# Patient Record
Sex: FEMALE | Race: WHITE | Employment: FULL TIME | URBAN - NONMETROPOLITAN AREA
[De-identification: names, ages, dates, MRNs, and addresses within clinical notes are randomized per-mention and may not be internally consistent; named-entity substitution may affect disease eponyms.]

---

## 2023-03-24 ENCOUNTER — OFFICE VISIT (OUTPATIENT)
Dept: BARIATRICS/WEIGHT MGMT | Age: 38
End: 2023-03-24
Payer: MEDICAID

## 2023-03-24 VITALS
WEIGHT: 268 LBS | HEART RATE: 88 BPM | HEIGHT: 63 IN | DIASTOLIC BLOOD PRESSURE: 76 MMHG | SYSTOLIC BLOOD PRESSURE: 94 MMHG | TEMPERATURE: 98.2 F | BODY MASS INDEX: 47.48 KG/M2

## 2023-03-24 DIAGNOSIS — K76.0 FATTY LIVER DISEASE, NONALCOHOLIC: ICD-10-CM

## 2023-03-24 DIAGNOSIS — E66.9 DIABETES MELLITUS TYPE 2 IN OBESE (HCC): ICD-10-CM

## 2023-03-24 DIAGNOSIS — E66.01 MORBID OBESITY (HCC): Primary | ICD-10-CM

## 2023-03-24 DIAGNOSIS — E78.5 HYPERLIPIDEMIA, UNSPECIFIED HYPERLIPIDEMIA TYPE: ICD-10-CM

## 2023-03-24 DIAGNOSIS — I10 ESSENTIAL HYPERTENSION: ICD-10-CM

## 2023-03-24 DIAGNOSIS — Z91.89 AT RISK FOR OBSTRUCTIVE SLEEP APNEA: ICD-10-CM

## 2023-03-24 DIAGNOSIS — E11.69 DIABETES MELLITUS TYPE 2 IN OBESE (HCC): ICD-10-CM

## 2023-03-24 PROCEDURE — 3074F SYST BP LT 130 MM HG: CPT | Performed by: SURGERY

## 2023-03-24 PROCEDURE — 99203 OFFICE O/P NEW LOW 30 MIN: CPT | Performed by: SURGERY

## 2023-03-24 PROCEDURE — 3078F DIAST BP <80 MM HG: CPT | Performed by: SURGERY

## 2023-03-24 RX ORDER — DULAGLUTIDE 3 MG/.5ML
INJECTION, SOLUTION SUBCUTANEOUS
COMMUNITY
Start: 2023-03-07

## 2023-03-24 RX ORDER — GLIMEPIRIDE 4 MG/1
TABLET ORAL
COMMUNITY
Start: 2023-03-07

## 2023-03-24 RX ORDER — LOSARTAN POTASSIUM 25 MG/1
TABLET ORAL
COMMUNITY
Start: 2023-01-26

## 2023-03-24 RX ORDER — ATORVASTATIN CALCIUM 20 MG/1
TABLET, FILM COATED ORAL
COMMUNITY
Start: 2023-03-23

## 2023-03-24 RX ORDER — PERPHENAZINE 16 MG
TABLET ORAL
COMMUNITY
Start: 2023-02-14

## 2023-03-24 RX ORDER — ESOMEPRAZOLE MAGNESIUM 40 MG/1
CAPSULE, DELAYED RELEASE ORAL
COMMUNITY
Start: 2023-03-23

## 2023-03-25 ASSESSMENT — ENCOUNTER SYMPTOMS
BLOOD IN STOOL: 0
PHOTOPHOBIA: 0
ALLERGIC/IMMUNOLOGIC NEGATIVE: 1
CHEST TIGHTNESS: 0
FACIAL SWELLING: 0
ANAL BLEEDING: 0
ABDOMINAL PAIN: 0
COLOR CHANGE: 0
ABDOMINAL DISTENTION: 0
APNEA: 0
VOICE CHANGE: 0
TROUBLE SWALLOWING: 0
EYE ITCHING: 0
SHORTNESS OF BREATH: 0
WHEEZING: 0
RECTAL PAIN: 0
CONSTIPATION: 0
CHOKING: 0
SINUS PRESSURE: 0
EYE DISCHARGE: 0
DIARRHEA: 0
EYE PAIN: 0
BACK PAIN: 0
EYE REDNESS: 0
SORE THROAT: 0
COUGH: 0
VOMITING: 0
STRIDOR: 0
RHINORRHEA: 0
NAUSEA: 0

## 2023-03-25 NOTE — PROGRESS NOTES
Sitting   Cuff Size: Large Adult   Pulse: 88   Temp: 98.2 °F (36.8 °C)   TempSrc: Oral   Weight: 268 lb (121.6 kg)   Height: 5' 2.75\" (1.594 m)     Body mass index is 47.85 kg/m². Wt Readings from Last 3 Encounters:   03/24/23 268 lb (121.6 kg)     Physical Exam  Vitals reviewed. Constitutional:       General: She is not in acute distress. Appearance: She is well-developed. She is not diaphoretic. HENT:      Head: Normocephalic and atraumatic. Right Ear: External ear normal.      Left Ear: External ear normal.      Nose: Nose normal.   Eyes:      General: No scleral icterus. Right eye: No discharge. Left eye: No discharge. Conjunctiva/sclera: Conjunctivae normal.   Cardiovascular:      Rate and Rhythm: Normal rate and regular rhythm. Heart sounds: Normal heart sounds. Pulmonary:      Effort: Pulmonary effort is normal. No respiratory distress. Breath sounds: Normal breath sounds. No wheezing or rales. Chest:      Chest wall: No tenderness. Abdominal:      General: Bowel sounds are normal. There is no distension. Palpations: Abdomen is soft. There is no mass. Tenderness: There is no abdominal tenderness. There is no guarding or rebound. Musculoskeletal:         General: No tenderness. Normal range of motion. Cervical back: Normal range of motion and neck supple. Skin:     General: Skin is warm and dry. Coloration: Skin is not pale. Findings: No erythema or rash. Neurological:      Mental Status: She is alert and oriented to person, place, and time. Cranial Nerves: No cranial nerve deficit. Psychiatric:         Behavior: Behavior normal.         Thought Content:  Thought content normal.         Judgment: Judgment normal.     No results found for: WBC, HGB, HCT, MCV, PLT  No results found for: NA, K, CL, CO2  No results found for: CREATININE  No results found for: ALT, AST, GGT, ALKPHOS, BILITOT  No results found for:

## 2023-05-03 NOTE — PROGRESS NOTES
Patient is a 45 y.o. female seen for   initial  MNT visit for  pre op bariatric surgery desires sleeve vs bypass. Spouse present at office visit. BMI: Body mass index is 47.14 kg/m². Obesity Classification: Class III    Weight History: Wt Readings from Last 3 Encounters:   05/05/23 264 lb (119.7 kg)   03/24/23 268 lb (121.6 kg)   02/15/23 269 lb 6.4 oz (122.2 kg)     SERG referral placed 3/25/23. Pt will require to contact Pulmonary office to set up SERG evaluation  Smoker-states working on quitting  Hx of DM - on Trulicity. Occasionally checks glucose levels  On Nexium with hx of heartburn  Pt goes to [facility] and will be seeing a new provider -appointment in June  States will have A1C drawn then    Patient is taking a Multivitamin daily:  Taking gummies MVI and a blood sugar support vitamin    Describe your current weight: States feet hurt and limited on what able to do. Has to leave work early when feet hurt and has to sit down. How does your weight affect your daily activities? concern over medical problems, lack of energy. Patient's lowest adult weight was 220 lbs at age 22-23. The lowest weight was achieved through younger age, more active. States had increased weight gain after had kids ~age 24. Received the Depo Shot after pregnancy reports 50 lb weight gain in two months    Patient's highest adult weight was 398 lbs at age 32-45. Patient was at her highest weight for 1 year. Diagnosed with DM two years ago and states has lost weight since then but also mentions did not try to lose that weight on own     Patient has participated in the following weight loss programs: Slim Fast, [employer] Shakes, and Keto. Patient has participated in meal replacement/liquid diets. Patient has participated in weight loss medications. - Adipex 10 years ago    Patient is not lactose intolerant. Patient does not have Tenriism/cultural food concerns.   Patient does not have food

## 2023-05-05 ENCOUNTER — OFFICE VISIT (OUTPATIENT)
Dept: BARIATRICS/WEIGHT MGMT | Age: 38
End: 2023-05-05

## 2023-05-05 VITALS — HEIGHT: 63 IN | BODY MASS INDEX: 46.78 KG/M2 | WEIGHT: 264 LBS

## 2023-05-05 DIAGNOSIS — E78.5 HYPERLIPIDEMIA, UNSPECIFIED HYPERLIPIDEMIA TYPE: ICD-10-CM

## 2023-05-05 DIAGNOSIS — Z01.818 PRE-OP TESTING: ICD-10-CM

## 2023-05-05 DIAGNOSIS — E66.01 MORBID OBESITY WITH BMI OF 45.0-49.9, ADULT (HCC): Primary | ICD-10-CM

## 2023-05-05 DIAGNOSIS — I10 ESSENTIAL HYPERTENSION: ICD-10-CM

## 2023-05-05 DIAGNOSIS — E11.69 DIABETES MELLITUS TYPE 2 IN OBESE (HCC): ICD-10-CM

## 2023-05-05 DIAGNOSIS — Z13.21 MALNUTRITION SCREEN: ICD-10-CM

## 2023-05-05 DIAGNOSIS — E66.9 DIABETES MELLITUS TYPE 2 IN OBESE (HCC): ICD-10-CM

## 2023-05-30 ENCOUNTER — HOSPITAL ENCOUNTER (OUTPATIENT)
Age: 38
Setting detail: SPECIMEN
Discharge: HOME | End: 2023-05-30

## 2023-05-30 LAB
ALBUMIN SERPL-MCNC: 4 G/DL (ref 3.5–5.2)
ALBUMIN/GLOB SERPL: 1.1 {RATIO} (ref 1–2.5)
ALP SERPL-CCNC: 77 U/L (ref 35–104)
ALT SERPL-CCNC: 29 U/L (ref 5–33)
AMPHET UR QL SCN: NEGATIVE
ANION GAP SERPL CALCULATED.3IONS-SCNC: 13 MMOL/L (ref 9–17)
AST SERPL-CCNC: 19 U/L
BARBITURATES UR QL SCN: NEGATIVE
BASOPHILS # BLD: 0.05 K/UL (ref 0–0.2)
BASOPHILS NFR BLD: 1 % (ref 0–2)
BENZODIAZ UR QL: NEGATIVE
BILIRUB SERPL-MCNC: 0.5 MG/DL (ref 0.3–1.2)
BUN SERPL-MCNC: 12 MG/DL (ref 6–20)
CALCIUM SERPL-MCNC: 9.4 MG/DL (ref 8.6–10.4)
CANNABINOID SCREEN URINE: NEGATIVE
CHLORIDE SERPL-SCNC: 99 MMOL/L (ref 98–107)
CHOLEST SERPL-MCNC: 142 MG/DL
CHOLESTEROL/HDL RATIO: 4.1
CO2 SERPL-SCNC: 21 MMOL/L (ref 20–31)
COCAINE UR QL SCN: NEGATIVE
CREAT SERPL-MCNC: 0.45 MG/DL (ref 0.5–0.9)
EOSINOPHIL # BLD: 0.16 K/UL (ref 0–0.44)
EOSINOPHILS RELATIVE PERCENT: 2 % (ref 1–4)
ERYTHROCYTE [DISTWIDTH] IN BLOOD BY AUTOMATED COUNT: 13.2 % (ref 11.8–14.4)
FENTANYL URINE: NEGATIVE
GFR SERPL CREATININE-BSD FRML MDRD: >60 ML/MIN/1.73M2
GLUCOSE SERPL-MCNC: 159 MG/DL (ref 70–99)
HCT VFR BLD AUTO: 39.5 % (ref 36.3–47.1)
HDLC SERPL-MCNC: 35 MG/DL
HGB BLD-MCNC: 12.8 G/DL (ref 11.9–15.1)
IMM GRANULOCYTES # BLD AUTO: 0.06 K/UL (ref 0–0.3)
IMM GRANULOCYTES NFR BLD: 1 %
INR PPP: 0.9
LDLC SERPL CALC-MCNC: 65 MG/DL (ref 0–130)
LYMPHOCYTES # BLD: 15 % (ref 24–43)
LYMPHOCYTES NFR BLD: 1.49 K/UL (ref 1.1–3.7)
MCH RBC QN AUTO: 28.8 PG (ref 25.2–33.5)
MCHC RBC AUTO-ENTMCNC: 32.4 G/DL (ref 28.4–34.8)
MCV RBC AUTO: 88.8 FL (ref 82.6–102.9)
METHADONE SCREEN, URINE: NEGATIVE
MONOCYTES NFR BLD: 0.48 K/UL (ref 0.1–1.2)
MONOCYTES NFR BLD: 5 % (ref 3–12)
NEUTROPHILS NFR BLD: 77 % (ref 36–65)
NEUTS SEG NFR BLD: 7.47 K/UL (ref 1.5–8.1)
NRBC AUTOMATED: 0 PER 100 WBC
OPIATES UR QL SCN: NEGATIVE
OXYCODONE SCREEN URINE: NEGATIVE
PCP UR QL SCN: NEGATIVE
PLATELET # BLD AUTO: ABNORMAL K/UL (ref 138–453)
PLATELET, FLUORESCENCE: 190 K/UL (ref 138–453)
PLATELETS.RETICULATED NFR BLD AUTO: 11.7 % (ref 1.1–10.3)
POTASSIUM SERPL-SCNC: 4.6 MMOL/L (ref 3.7–5.3)
PROT SERPL-MCNC: 7.6 G/DL (ref 6.4–8.3)
PROTHROMBIN TIME: 12.3 SEC (ref 11.7–14.9)
RBC # BLD AUTO: 4.45 M/UL (ref 3.95–5.11)
SODIUM SERPL-SCNC: 133 MMOL/L (ref 135–144)
TEST INFORMATION: NORMAL
TRIGL SERPL-MCNC: 211 MG/DL
TSH SERPL-MCNC: 3.19 UIU/ML (ref 0.3–5)
WBC OTHER # BLD: 9.7 K/UL (ref 3.5–11.3)

## 2023-05-31 LAB
25(OH)D3 SERPL-MCNC: 43.3 NG/ML
EST. AVERAGE GLUCOSE BLD GHB EST-MCNC: 131 MG/DL
FERRITIN SERPL-MCNC: 62 NG/ML (ref 13–150)
FOLATE SERPL-MCNC: 14.1 NG/ML
HBA1C MFR BLD: 6.2 % (ref 4–6)
IRON SATN MFR SERPL: 20 % (ref 20–55)
IRON SERPL-MCNC: 62 UG/DL (ref 37–145)
PTH-INTACT SERPL-MCNC: 21.4 PG/ML (ref 14–72)
TIBC SERPL-MCNC: 312 UG/DL (ref 250–450)
UNSATURATED IRON BINDING CAPACITY: 250 UG/DL (ref 112–347)
VIT B12 SERPL-MCNC: 356 PG/ML (ref 232–1245)

## 2023-06-02 LAB
RETINYL PALMITATE: <0.02 MG/L (ref 0–0.1)
VIT B1 PYROPHOSHATE BLD-SCNC: 141 NMOL/L (ref 70–180)
VITAMIN A LEVEL: 0.74 MG/L (ref 0.3–1.2)
VITAMIN A, INTERP: NORMAL

## 2023-06-03 LAB
COTININE: <5 NG/ML
NICOTINE: <5 NG/ML

## 2023-06-09 ENCOUNTER — OFFICE VISIT (OUTPATIENT)
Dept: PSYCHOLOGY | Age: 38
End: 2023-06-09

## 2023-06-09 DIAGNOSIS — E66.01 MORBID OBESITY (HCC): ICD-10-CM

## 2023-06-09 DIAGNOSIS — F81.0 READING DIFFICULTY: ICD-10-CM

## 2023-06-09 DIAGNOSIS — F41.9 ANXIETY DISORDER, UNSPECIFIED TYPE: Primary | ICD-10-CM

## 2023-06-09 DIAGNOSIS — F17.200 TOBACCO USE DISORDER: ICD-10-CM

## 2023-06-19 ENCOUNTER — OFFICE VISIT (OUTPATIENT)
Dept: BARIATRICS/WEIGHT MGMT | Age: 38
End: 2023-06-19

## 2023-06-19 VITALS — HEIGHT: 63 IN | WEIGHT: 268.6 LBS | BODY MASS INDEX: 47.59 KG/M2

## 2023-06-19 DIAGNOSIS — E66.01 MORBID OBESITY WITH BMI OF 45.0-49.9, ADULT (HCC): Primary | ICD-10-CM

## 2023-06-19 NOTE — PATIENT INSTRUCTIONS
Goals:  Nutrition Goal:  I will continue to use food journal to record meal times, serving sizes and bring back to next dietitian visit  Water Goal:  Continue at least two 32 oz water containers or greater each day  Exercise Goal:  Try five minute walk with your dog three times a week consistently and increase tie from there

## 2023-06-27 ENCOUNTER — OFFICE VISIT (OUTPATIENT)
Dept: PULMONOLOGY | Age: 38
End: 2023-06-27
Payer: MEDICAID

## 2023-06-27 VITALS
HEIGHT: 62 IN | WEIGHT: 269 LBS | TEMPERATURE: 98.7 F | HEART RATE: 95 BPM | DIASTOLIC BLOOD PRESSURE: 84 MMHG | SYSTOLIC BLOOD PRESSURE: 122 MMHG | OXYGEN SATURATION: 98 % | BODY MASS INDEX: 49.5 KG/M2

## 2023-06-27 DIAGNOSIS — I10 HYPERTENSION, UNSPECIFIED TYPE: ICD-10-CM

## 2023-06-27 DIAGNOSIS — G47.19 EXCESSIVE DAYTIME SLEEPINESS: ICD-10-CM

## 2023-06-27 DIAGNOSIS — F17.201 TOBACCO USE DISORDER, MODERATE, IN EARLY REMISSION: ICD-10-CM

## 2023-06-27 DIAGNOSIS — G47.30 SLEEP APNEA, UNSPECIFIED TYPE: Primary | ICD-10-CM

## 2023-06-27 DIAGNOSIS — G47.26 SHIFT WORK SLEEP DISORDER: ICD-10-CM

## 2023-06-27 PROCEDURE — 3079F DIAST BP 80-89 MM HG: CPT | Performed by: INTERNAL MEDICINE

## 2023-06-27 PROCEDURE — 3074F SYST BP LT 130 MM HG: CPT | Performed by: INTERNAL MEDICINE

## 2023-06-27 PROCEDURE — 99204 OFFICE O/P NEW MOD 45 MIN: CPT | Performed by: INTERNAL MEDICINE

## 2023-07-14 ENCOUNTER — TELEPHONE (OUTPATIENT)
Dept: SLEEP CENTER | Age: 38
End: 2023-07-14

## 2023-07-14 NOTE — TELEPHONE ENCOUNTER
Left patient a vm letting her know that we did receive an authorization for her HST from [facility] if she wants to r/s. We currently have a couple of openings in July if she calls back. The HST will need completed by July 31st, per Yuly guidelines on the extension.           Joan Book

## 2024-03-05 ENCOUNTER — HOSPITAL ENCOUNTER (OUTPATIENT)
Age: 39
Setting detail: SPECIMEN
Discharge: HOME | End: 2024-03-05

## 2024-03-05 ENCOUNTER — OFFICE VISIT (OUTPATIENT)
Dept: OBGYN CLINIC | Age: 39
End: 2024-03-05
Payer: MEDICAID

## 2024-03-05 VITALS
SYSTOLIC BLOOD PRESSURE: 124 MMHG | HEIGHT: 62 IN | DIASTOLIC BLOOD PRESSURE: 84 MMHG | BODY MASS INDEX: 50.2 KG/M2 | WEIGHT: 272.8 LBS

## 2024-03-05 DIAGNOSIS — N89.8 VAGINAL ODOR: ICD-10-CM

## 2024-03-05 DIAGNOSIS — Z01.419 WOMEN'S ANNUAL ROUTINE GYNECOLOGICAL EXAMINATION: Primary | ICD-10-CM

## 2024-03-05 PROCEDURE — 99395 PREV VISIT EST AGE 18-39: CPT | Performed by: NURSE PRACTITIONER

## 2024-03-05 RX ORDER — AMPICILLIN TRIHYDRATE 250 MG
CAPSULE ORAL
COMMUNITY
Start: 2023-11-15

## 2024-03-05 RX ORDER — OMEPRAZOLE 40 MG/1
40 CAPSULE, DELAYED RELEASE ORAL DAILY
COMMUNITY
Start: 2024-02-22

## 2024-03-05 RX ORDER — FENOFIBRATE 67 MG/1
CAPSULE ORAL
COMMUNITY
Start: 2024-02-29

## 2024-03-05 ASSESSMENT — ENCOUNTER SYMPTOMS
SHORTNESS OF BREATH: 0
ABDOMINAL PAIN: 0
CONSTIPATION: 0
DIARRHEA: 0

## 2024-03-05 NOTE — PROGRESS NOTES
YEARLY PHYSICAL    Date of service: 3/5/2024    Zahra Hinkle  Is a 38 y.o. female    PT's PCP is: Marbella Ventura, [facility] - NP     : 1985                                         Chaperone for Intimate Exam  Chaperone was offered as part of the rooming process. Patient declined and agrees to continue with exam without a chaperone.  Chaperone: n/a      Subjective:       Patient's last menstrual period was 2024.     Are your menses regular: yes    OB History    Para Term  AB Living   2 2 2 0 0 2   SAB IAB Ectopic Molar Multiple Live Births   0 0 0 0   2      # Outcome Date GA Lbr Sim/2nd Weight Sex Delivery Anes PTL Lv   2 Term 13 39w0d   M Vag-Spont   ANKITA   1 Term 07/31/10 37w6d   F Vag-Spont   ANKITA        Social History     Tobacco Use   Smoking Status Some Days    Current packs/day: 0.70    Average packs/day: 0.7 packs/day for 16.2 years (11.3 ttl pk-yrs)    Types: Cigarettes, E-Cigarettes    Start date: 2008   Smokeless Tobacco Never   Tobacco Comments    3-4 cigs per day, more social        Social History     Substance and Sexual Activity   Alcohol Use Yes    Alcohol/week: 3.0 standard drinks of alcohol    Types: 3 Drinks containing 0.5 oz of alcohol per week    Comment: social       Family History   Problem Relation Age of Onset    Hypertension Mother     Arthritis Mother     Arthritis Father     Diabetes Father     Heart Disease Father     Heart Surgery Father     Hypertension Sister     Diabetes Sister     Hypertension Maternal Grandfather     Diabetes Maternal Grandfather     Heart Disease Maternal Grandfather     Arthritis Maternal Grandmother     Hypertension Maternal Grandmother     Cancer Maternal Grandmother     Heart Disease Maternal Grandmother     Diabetes Paternal Grandfather     Diabetes Sister        Any family history of breast or ovarian cancer: No    Any family history of blood

## 2024-03-06 LAB
CANDIDA SPECIES: NEGATIVE
GARDNERELLA VAGINALIS: NEGATIVE
HPV I/H RISK 4 DNA CVX QL NAA+PROBE: NOT DETECTED
HPV SAMPLE: NORMAL
HPV, INTERPRETATION: NORMAL
HPV16 DNA CVX QL NAA+PROBE: NOT DETECTED
HPV18 DNA CVX QL NAA+PROBE: NOT DETECTED
SOURCE: NORMAL
SPECIMEN DESCRIPTION: NORMAL
TRICHOMONAS: NEGATIVE

## 2024-03-14 LAB — CYTOLOGY REPORT: NORMAL

## 2025-07-02 ENCOUNTER — OFFICE VISIT (OUTPATIENT)
Dept: OBGYN CLINIC | Age: 40
End: 2025-07-02
Payer: MEDICAID

## 2025-07-02 VITALS
DIASTOLIC BLOOD PRESSURE: 74 MMHG | WEIGHT: 264.8 LBS | SYSTOLIC BLOOD PRESSURE: 124 MMHG | HEIGHT: 62 IN | BODY MASS INDEX: 48.73 KG/M2

## 2025-07-02 DIAGNOSIS — Z01.419 WOMEN'S ANNUAL ROUTINE GYNECOLOGICAL EXAMINATION: Primary | ICD-10-CM

## 2025-07-02 PROCEDURE — 99396 PREV VISIT EST AGE 40-64: CPT | Performed by: NURSE PRACTITIONER

## 2025-07-02 RX ORDER — TIRZEPATIDE 5 MG/.5ML
INJECTION, SOLUTION SUBCUTANEOUS
COMMUNITY
Start: 2025-07-01

## 2025-07-02 RX ORDER — CIDER VINEGAR 300 MG
TABLET ORAL
COMMUNITY
Start: 2025-05-01

## 2025-07-02 RX ORDER — HYDROCHLOROTHIAZIDE 12.5 MG/1
CAPSULE ORAL
COMMUNITY
Start: 2025-06-01

## 2025-07-02 RX ORDER — KETOROLAC TROMETHAMINE 30 MG/ML
INJECTION, SOLUTION INTRAMUSCULAR; INTRAVENOUS
COMMUNITY
Start: 2025-05-05

## 2025-07-02 RX ORDER — CALCIUM CITRATE/VITAMIN D3 200MG-6.25
TABLET ORAL
COMMUNITY
Start: 2025-04-30

## 2025-07-02 RX ORDER — DULAGLUTIDE 4.5 MG/.5ML
INJECTION, SOLUTION SUBCUTANEOUS
COMMUNITY
Start: 2025-04-30 | End: 2025-07-02 | Stop reason: ALTCHOICE

## 2025-07-02 RX ORDER — AMOXICILLIN 500 MG
CAPSULE ORAL
COMMUNITY
Start: 2025-06-01

## 2025-07-02 RX ORDER — GABAPENTIN 800 MG/1
800 TABLET ORAL NIGHTLY
COMMUNITY
Start: 2025-06-10

## 2025-07-02 ASSESSMENT — ENCOUNTER SYMPTOMS
ABDOMINAL PAIN: 0
SHORTNESS OF BREATH: 0
DIARRHEA: 0
CONSTIPATION: 0

## 2025-07-02 NOTE — PROGRESS NOTES
Diagnosis Date    Bacterial vaginitis     Body mass index 45.0-49.9, adult ([facility]) 07/13/2023    Diabetes mellitus (HCC)     Fatty liver     High cholesterol     HPV in female     Hypercholesteremia     Hypertension     Yeast infection        Past Surgical History:   Procedure Laterality Date    CYST REMOVAL      right hand    TONSILLECTOMY AND ADENOIDECTOMY      TUBAL LIGATION      WRIST GANGLION EXCISION Right        Family History   Problem Relation Age of Onset    Hypertension Mother     Arthritis Mother     Arthritis Father     Diabetes Father     Heart Disease Father     Heart Surgery Father     Hypertension Sister     Diabetes Sister     Hypertension Maternal Grandfather     Diabetes Maternal Grandfather     Heart Disease Maternal Grandfather     Arthritis Maternal Grandmother     Hypertension Maternal Grandmother     Cancer Maternal Grandmother     Heart Disease Maternal Grandmother     Diabetes Paternal Grandfather     Diabetes Sister        Chief Complaint   Patient presents with    Annual Exam     Last pap 3/5/24.     Breast Problem     Intermittent bilateral breast pain- worse 1 wk before menses and 1 wk after.          PE:  Vital Signs  Blood pressure 124/74, height 1.575 m (5' 2\"), weight 120.1 kg (264 lb 12.8 oz), last menstrual period 06/15/2025.  Estimated body mass index is 48.43 kg/m² as calculated from the following:    Height as of this encounter: 1.575 m (5' 2\").    Weight as of this encounter: 120.1 kg (264 lb 12.8 oz).      HPI: Patient presents today for annual exam. Feeling well, voices no concerns. Denies breast/pelvic pain. Negative pap 3/2024. Mammogram due; reviewed where/how to schedule. Wellness reviewed; follows with PCP. Reports monthly menses.         Review of Systems   Constitutional:  Negative for chills, fatigue and fever.   Respiratory:  Negative for shortness of breath.    Cardiovascular:  Negative for chest pain.   Gastrointestinal:  Negative for abdominal pain, constipation